# Patient Record
Sex: MALE | Race: WHITE | NOT HISPANIC OR LATINO
[De-identification: names, ages, dates, MRNs, and addresses within clinical notes are randomized per-mention and may not be internally consistent; named-entity substitution may affect disease eponyms.]

---

## 2017-01-24 ENCOUNTER — TRANSCRIPTION ENCOUNTER (OUTPATIENT)
Age: 42
End: 2017-01-24

## 2018-01-21 ENCOUNTER — TRANSCRIPTION ENCOUNTER (OUTPATIENT)
Age: 43
End: 2018-01-21

## 2018-12-03 ENCOUNTER — TRANSCRIPTION ENCOUNTER (OUTPATIENT)
Age: 43
End: 2018-12-03

## 2020-07-07 ENCOUNTER — TRANSCRIPTION ENCOUNTER (OUTPATIENT)
Age: 45
End: 2020-07-07

## 2021-01-06 ENCOUNTER — NON-APPOINTMENT (OUTPATIENT)
Age: 46
End: 2021-01-06

## 2021-01-07 ENCOUNTER — NON-APPOINTMENT (OUTPATIENT)
Age: 46
End: 2021-01-07

## 2021-01-15 PROBLEM — Z87.891 FORMER SMOKER: Status: ACTIVE | Noted: 2021-01-15

## 2021-01-15 PROBLEM — E73.9 LACTOSE INTOLERANCE IN ADULT: Status: ACTIVE | Noted: 2021-01-15

## 2021-01-15 PROBLEM — L70.9 ACNE, UNSPECIFIED ACNE TYPE: Status: ACTIVE | Noted: 2021-01-15

## 2021-01-15 PROBLEM — Z87.448 HISTORY OF GROSS HEMATURIA: Status: RESOLVED | Noted: 2021-01-15 | Resolved: 2021-01-15

## 2021-01-15 PROBLEM — Z84.1 FAMILY HISTORY OF CHRONIC KIDNEY DISEASE: Status: ACTIVE | Noted: 2021-01-15

## 2021-01-15 PROBLEM — Z87.448 HISTORY OF RENAL COLIC: Status: RESOLVED | Noted: 2021-01-15 | Resolved: 2021-01-15

## 2021-01-15 PROBLEM — I86.1 BILATERAL VARICOCELES: Status: RESOLVED | Noted: 2021-01-15 | Resolved: 2021-01-15

## 2021-01-15 PROBLEM — R51.9 CHRONIC HEADACHE: Status: ACTIVE | Noted: 2021-01-15

## 2021-01-15 PROBLEM — Z87.898 FORMER CONSUMPTION OF ALCOHOL: Status: ACTIVE | Noted: 2021-01-15

## 2021-01-21 ENCOUNTER — APPOINTMENT (OUTPATIENT)
Dept: UROLOGY | Facility: CLINIC | Age: 46
End: 2021-01-21
Payer: COMMERCIAL

## 2021-01-21 VITALS
SYSTOLIC BLOOD PRESSURE: 145 MMHG | OXYGEN SATURATION: 97 % | TEMPERATURE: 97.6 F | HEART RATE: 77 BPM | DIASTOLIC BLOOD PRESSURE: 92 MMHG

## 2021-01-21 DIAGNOSIS — L70.9 ACNE, UNSPECIFIED: ICD-10-CM

## 2021-01-21 DIAGNOSIS — Z87.448 PERSONAL HISTORY OF OTHER DISEASES OF URINARY SYSTEM: ICD-10-CM

## 2021-01-21 DIAGNOSIS — Z84.1 FAMILY HISTORY OF DISORDERS OF KIDNEY AND URETER: ICD-10-CM

## 2021-01-21 DIAGNOSIS — E73.9 LACTOSE INTOLERANCE, UNSPECIFIED: ICD-10-CM

## 2021-01-21 DIAGNOSIS — I86.1 SCROTAL VARICES: ICD-10-CM

## 2021-01-21 DIAGNOSIS — G89.29 HEADACHE, UNSPECIFIED: ICD-10-CM

## 2021-01-21 DIAGNOSIS — Z87.898 PERSONAL HISTORY OF OTHER SPECIFIED CONDITIONS: ICD-10-CM

## 2021-01-21 DIAGNOSIS — R51.9 HEADACHE, UNSPECIFIED: ICD-10-CM

## 2021-01-21 DIAGNOSIS — Z87.891 PERSONAL HISTORY OF NICOTINE DEPENDENCE: ICD-10-CM

## 2021-01-21 LAB
BILIRUB UR QL STRIP: NORMAL
CLARITY UR: CLEAR
COLLECTION METHOD: NORMAL
GLUCOSE UR-MCNC: NORMAL
HCG UR QL: 0.2 EU/DL
HGB UR QL STRIP.AUTO: NORMAL
KETONES UR-MCNC: NORMAL
LEUKOCYTE ESTERASE UR QL STRIP: NORMAL
NITRITE UR QL STRIP: NORMAL
PH UR STRIP: 6.5
PROT UR STRIP-MCNC: NORMAL
SP GR UR STRIP: 1.01

## 2021-01-21 PROCEDURE — 99204 OFFICE O/P NEW MOD 45 MIN: CPT | Mod: 25

## 2021-01-21 PROCEDURE — 81003 URINALYSIS AUTO W/O SCOPE: CPT | Mod: QW

## 2021-01-21 PROCEDURE — 76857 US EXAM PELVIC LIMITED: CPT

## 2021-01-21 PROCEDURE — 99072 ADDL SUPL MATRL&STAF TM PHE: CPT

## 2021-01-21 RX ORDER — MINOCYCLINE HYDROCHLORIDE 50 MG/1
50 CAPSULE ORAL
Refills: 0 | Status: DISCONTINUED | COMMUNITY
End: 2021-01-21

## 2021-01-21 RX ORDER — AMPICILLIN 500 MG/1
500 CAPSULE ORAL
Refills: 0 | Status: ACTIVE | COMMUNITY

## 2021-01-21 NOTE — LETTER BODY
[Dear  ___] : Dear  [unfilled], [Consult Letter:] : I had the pleasure of evaluating your patient, [unfilled]. [Please see my note below.] : Please see my note below. [Consult Closing:] : Thank you very much for allowing me to participate in the care of this patient.  If you have any questions, please do not hesitate to contact me. [Sincerely,] : Sincerely, [FreeTextEntry3] : Jayden Nunez MD, FACS

## 2021-01-21 NOTE — PHYSICAL EXAM
[General Appearance - Well Developed] : well developed [General Appearance - Well Nourished] : well nourished [Normal Appearance] : normal appearance [Well Groomed] : well groomed [General Appearance - In No Acute Distress] : no acute distress [Edema] : no peripheral edema [Respiration, Rhythm And Depth] : normal respiratory rhythm and effort [Exaggerated Use Of Accessory Muscles For Inspiration] : no accessory muscle use [Abdomen Soft] : soft [Abdomen Tenderness] : non-tender [Costovertebral Angle Tenderness] : no ~M costovertebral angle tenderness [Urethral Meatus] : meatus normal [Urinary Bladder Findings] : the bladder was normal on palpation [Scrotum] : the scrotum was normal [Testes Mass (___cm)] : there were no testicular masses [No Prostate Nodules] : no prostate nodules [Normal Station and Gait] : the gait and station were normal for the patient's age [] : no rash [No Focal Deficits] : no focal deficits [Oriented To Time, Place, And Person] : oriented to person, place, and time [Affect] : the affect was normal [Mood] : the mood was normal [Not Anxious] : not anxious [No Palpable Adenopathy] : no palpable adenopathy [FreeTextEntry1] : left varicocele

## 2021-01-21 NOTE — HISTORY OF PRESENT ILLNESS
[FreeTextEntry1] : Mr. JESSI CULP comes in today for a urologic evaluation, having last been seen in April 2017. Approximatley 3 weeks ago he experienced a recurrence of left flank pain, which resolved spontaneously after taking advil alone.  \par \par From his general urologic history, Mr. Culp presents with mild chronic lower urinary tract symptoms (predominantly frequency), with nocturia x 1. \par IPSS: 6/35\par Sono: 30cc PVR; 45cc prostate\par \par Additionally, Mr. Culp has a bulbous urethral stricture (calibrated to 18 Fr) diagnosed in 2017 on a cystoscopy. \par \par He has normal erections. \par \par He and his wife have previously appeared for a fertility consultation  Three years ago their daughter was born, conceived without any intervention.\par \par CT: 1/18/21--Multiple bilateral non-obstructing renal calculi, measuring up to 2mm: 3, 1mm  left and 5-6, 1mm right renal stones.  Some of these appear new; 2/15/17--Increased scattered 0.2 - 0.4 cm bilateral renal calcifications measuring up to 227HU; 5/20/10--Two tiny left upper pole renal stones, likely 6mm left renal cyst; 2/4/09--No stone seen; 7/30/04--Interval resolution of renal calculi and collecting system obstruction; 11/02--3 mm left distal ureteral stone. \par \par Renal US: 4/11/17--Bilateral 3 mm renal stones. No change from 2/17 CT; 5/23/13--No stones seen. 5/9/12-- 3mm left renal stone; 5/18/11--3mm left renal stone, 8 mm left renal cyst. 12/7/10--Left upper pole stones; 8/21/09--Unremarkable kidneys; \par \par KUB: 5/23/13--No stones seen; 5/9/12--No stone seen; 8/31/09--Essentially normal; 8/21/09--7 mm calcification over left sacroiliac joint; 2/4/09--Normal study; 8/2/04--No stone seen; \par \par MSE: 2/27/17--Borderline hyperoxaluria, marked hypocitraturia; 5/24/10--Low volume, elevated Ca++Oxalate and uric acid. \par \par Labs: 2/27/17--Minimally elevated serum uric acid. 1/20/17--Testosterone: 152.9; 10/15/16--Testosterone: 162.5; 5/8/10--SMAC: Ca++10.4, ALT--67; \par \par Stone Analysis: 3/8/11--95% Ca++ Oxalate, 5% Ca++ Phosphate; 4/8/09--Ca++ Oxalate; \par \par PSAs: 1/20/17--0.62;

## 2021-01-21 NOTE — ASSESSMENT
[FreeTextEntry1] : I discussed the findings and options with Mr. JESSI CULP in detail.\par Because of his longstanding history of urolithiasis, I advised that he consider a metabolic stone evaluation and I have referred him to Dr. Marco aSrkar for this as well as any more specific dietary recommendations.\par \par From a Urologic perspective, I would like to see Mr. Culp in one year.

## 2021-01-21 NOTE — ADDENDUM
[FreeTextEntry1] : A portion of this note was written by [Carlitos Albrecht] on 01/15/2021 acting as a scribe for Dr. Nunez. \par \par I have personally reviewed the chart and agree that the record accurately reflects my personal performance of the history, physical exam, assessment, and plan.

## 2021-08-16 ENCOUNTER — NON-APPOINTMENT (OUTPATIENT)
Age: 46
End: 2021-08-16

## 2021-08-16 ENCOUNTER — APPOINTMENT (OUTPATIENT)
Dept: NEPHROLOGY | Facility: CLINIC | Age: 46
End: 2021-08-16
Payer: COMMERCIAL

## 2021-08-16 VITALS — HEART RATE: 70 BPM | DIASTOLIC BLOOD PRESSURE: 72 MMHG | SYSTOLIC BLOOD PRESSURE: 106 MMHG

## 2021-08-16 VITALS — TEMPERATURE: 97.3 F | WEIGHT: 153 LBS

## 2021-08-16 PROCEDURE — 99204 OFFICE O/P NEW MOD 45 MIN: CPT | Mod: 25

## 2021-08-16 PROCEDURE — 36415 COLL VENOUS BLD VENIPUNCTURE: CPT

## 2021-08-16 NOTE — HISTORY OF PRESENT ILLNESS
[FreeTextEntry1] : Kindly referred by Dr. Nunez for kidney stones. PCP: Dr. Dash Figueroa \par \par * Passed first stone 20 years ago. He has passed at least 12 stones, usually flank pain. No interventions required.   1 month ago stone passed painlessly. Stone analysis in 2011 and 2009 calcium oxalate. \par \par CT: 1/18/21--Multiple bilateral non-obstructing renal calculi, measuring up to 2mm: 3, 1mm left and 5-6, 1mm right renal stones. Some of these appear new; 2/15/17--Increased scattered 0.2 - 0.4 cm bilateral renal calcifications measuring up to 227HU; 5/20/10--Two tiny left upper pole renal stones, likely 6mm left renal cyst; 2/4/09--No stone seen; 7/30/04--Interval resolution of renal calculi and collecting system obstruction; 11/02--3 mm left distal ureteral stone.\par \par MSE: 2/27/17--Borderline hyperoxaluria, marked hypocitraturia; 5/24/10--Low volume, elevated Ca++Oxalate and uric acid. (To be obtained.) \par \par * HTN controlled. BP 120s - 130s at home. No lightheadedness. Compliant with medications. \par

## 2021-08-16 NOTE — ASSESSMENT
[FreeTextEntry1] : # Recurrent nephrolithiasis with low urine volume, marked hypocitraturia, and borderline hyperoxaluria.\par * Watermind brie prescribed for 3 L/d fluid intake. \par * Start potassium citrate tablets. \par * A point of care renal ultrasound using the Butterfly iQ was performed and the images were personally reviewed. (The patient understands that this was a brief study to evaluate for hydronephrosis and not a complete renal ultrasound.) The ultrasound showed no significant hydronephrosis, normal echogenicity. \par * Low oxalate diet.\par * Recheck litholink in 3 months.\par * Recheck labs, including PTH and ionized calcium given borderline hypercalcemia. \par \par # HTN controlled.\par * The patient's blood pressure was checked with the Omron [id number]-[id number] using the SPRINT trial protocol after sitting quietly in an empty room with arm supported, back supported, and feet on the floor for 5 minutes. The average of 3 readings were taken.\par * A counseling information sheet has been given (currently or previously, in-person or electronically). All their questions were answered.\par * The patient has been counseled to check their BP at home with an automatic arm cuff, write down the readings, and reach me directly on the phone immediately if they are persistently > 180 systolic or if SBP is less than 100 or if lightheadedness develops. They were counseled to bring in all blood pressure readings and medications next visit.\par * The patient has been counseled that regular office followup (at least every 4 months for now)  is important for monitoring and for their health, and that it is their responsibility to make follow up appointments.\par * The patient also has been counseled that they must never stop or change any medications without discussing this with me (or another physician). \par

## 2021-08-17 ENCOUNTER — TRANSCRIPTION ENCOUNTER (OUTPATIENT)
Age: 46
End: 2021-08-17

## 2021-08-18 LAB
25(OH)D3 SERPL-MCNC: 46.9 NG/ML
ALBUMIN SERPL ELPH-MCNC: 5 G/DL
ALP BLD-CCNC: 61 U/L
ALT SERPL-CCNC: 22 U/L
ANION GAP SERPL CALC-SCNC: 14 MMOL/L
APPEARANCE: CLEAR
AST SERPL-CCNC: 22 U/L
BASOPHILS # BLD AUTO: 0.03 K/UL
BASOPHILS NFR BLD AUTO: 0.3 %
BILIRUB SERPL-MCNC: 0.5 MG/DL
BILIRUBIN URINE: NEGATIVE
BLOOD URINE: NEGATIVE
BUN SERPL-MCNC: 16 MG/DL
CA-I SERPL-SCNC: 1.27 MMOL/L
CALCIUM SERPL-MCNC: 9.9 MG/DL
CALCIUM SERPL-MCNC: 9.9 MG/DL
CHLORIDE SERPL-SCNC: 98 MMOL/L
CO2 SERPL-SCNC: 25 MMOL/L
COLOR: YELLOW
CREAT SERPL-MCNC: 0.74 MG/DL
CREAT SPEC-SCNC: 108 MG/DL
CYSTATIN C SERPL-MCNC: 0.69 MG/L
EOSINOPHIL # BLD AUTO: 0.12 K/UL
EOSINOPHIL NFR BLD AUTO: 1.4 %
GFR/BSA.PRED SERPLBLD CYS-BASED-ARV: 118 ML/MIN
GLUCOSE QUALITATIVE U: NEGATIVE
GLUCOSE SERPL-MCNC: 89 MG/DL
HCT VFR BLD CALC: 46.8 %
HGB BLD-MCNC: 14.8 G/DL
IMM GRANULOCYTES NFR BLD AUTO: 0.3 %
KETONES URINE: NEGATIVE
LEUKOCYTE ESTERASE URINE: NEGATIVE
LYMPHOCYTES # BLD AUTO: 1.48 K/UL
LYMPHOCYTES NFR BLD AUTO: 17.1 %
MAN DIFF?: NORMAL
MCHC RBC-ENTMCNC: 30.6 PG
MCHC RBC-ENTMCNC: 31.6 GM/DL
MCV RBC AUTO: 96.7 FL
MICROALBUMIN 24H UR DL<=1MG/L-MCNC: <1.2 MG/DL
MICROALBUMIN/CREAT 24H UR-RTO: NORMAL MG/G
MONOCYTES # BLD AUTO: 0.58 K/UL
MONOCYTES NFR BLD AUTO: 6.7 %
NEUTROPHILS # BLD AUTO: 6.39 K/UL
NEUTROPHILS NFR BLD AUTO: 74.2 %
NITRITE URINE: NEGATIVE
PARATHYROID HORMONE INTACT: 34 PG/ML
PH URINE: 6
PLATELET # BLD AUTO: 192 K/UL
POTASSIUM SERPL-SCNC: 4 MMOL/L
PROT SERPL-MCNC: 7.2 G/DL
PROTEIN URINE: NEGATIVE
RBC # BLD: 4.84 M/UL
RBC # FLD: 13.1 %
SODIUM SERPL-SCNC: 137 MMOL/L
SPECIFIC GRAVITY URINE: 1.02
UROBILINOGEN URINE: NORMAL
VIT B12 SERPL-MCNC: 1821 PG/ML
WBC # FLD AUTO: 8.63 K/UL

## 2021-12-29 ENCOUNTER — APPOINTMENT (OUTPATIENT)
Dept: NEPHROLOGY | Facility: CLINIC | Age: 46
End: 2021-12-29
Payer: COMMERCIAL

## 2021-12-29 PROCEDURE — 99214 OFFICE O/P EST MOD 30 MIN: CPT | Mod: 95

## 2021-12-29 NOTE — ASSESSMENT
[FreeTextEntry1] : # Recurrent nephrolithiasis with low urine volume, marked hypocitraturia, and borderline hyperoxaluria.\par * Low oxalate diet. (Reduce black tea and spinach.) Reduce protein intake. \par * Continue potassium citrate tablets. \par * Recheck renal ultrasound. \par * Follow up in 4 months. \par \par # HTN controlled.\par * Cont diovan/HCTZ. \par * A counseling information sheet has been given (currently or previously, in-person or electronically). All their questions were answered.\par * The patient has been counseled to check their BP at home with an automatic arm cuff, write down the readings, and reach me directly on the phone immediately if they are persistently > 180 systolic or if SBP is less than 100 or if lightheadedness develops. They were counseled to bring in all blood pressure readings and medications next visit.\par * The patient has been counseled that regular office followup (at least every 4 months for now) is important for monitoring and for their health, and that it is their responsibility to make follow up appointments.\par * The patient also has been counseled that they must never stop or change any medications without discussing this with me (or another physician). \par

## 2021-12-29 NOTE — HISTORY OF PRESENT ILLNESS
[FreeTextEntry1] : Kindly referred by Dr. Nunez for kidney stones. Covid vaccine x 3 [id number]. \par \par * Urine volume increased to 2.33. SS CaCo. * Oxalate 40. Citrate 492. PCR 1.4.  * No recent kidney stones. * HTN controlled. No lightheadedness. Compliant with medications. \par \par Previous history ([id number]): * Passed first stone 20 years ago. He has passed at least 12 stones, usually flank pain. No interventions required.   1 month ago stone passed painlessly. Stone analysis in 2011 and 2009 calcium oxalate. \par \par CT: 1/18/21--Multiple bilateral non-obstructing renal calculi, measuring up to 2mm: 3, 1mm left and 5-6, 1mm right renal stones. Some of these appear new; 2/15/17--Increased scattered 0.2 - 0.4 cm bilateral renal calcifications measuring up to 227HU; 5/20/10--Two tiny left upper pole renal stones, likely 6mm left renal cyst; 2/4/09--No stone seen; 7/30/04--Interval resolution of renal calculi and collecting system obstruction; 11/02--3 mm left distal ureteral stone.\par \par MSE: 2/27/17--Borderline hyperoxaluria, marked hypocitraturia; 5/24/10--Low volume, elevated Ca++Oxalate and uric acid. (To be obtained.) \par \par

## 2022-08-05 ENCOUNTER — TRANSCRIPTION ENCOUNTER (OUTPATIENT)
Age: 47
End: 2022-08-05

## 2022-11-04 ENCOUNTER — RESULT REVIEW (OUTPATIENT)
Age: 47
End: 2022-11-04

## 2022-11-17 ENCOUNTER — OUTPATIENT (OUTPATIENT)
Dept: OUTPATIENT SERVICES | Facility: HOSPITAL | Age: 47
LOS: 1 days | End: 2022-11-17
Payer: COMMERCIAL

## 2022-11-17 ENCOUNTER — APPOINTMENT (OUTPATIENT)
Dept: ULTRASOUND IMAGING | Facility: HOSPITAL | Age: 47
End: 2022-11-17

## 2022-11-17 PROCEDURE — 76770 US EXAM ABDO BACK WALL COMP: CPT

## 2022-11-17 PROCEDURE — 76770 US EXAM ABDO BACK WALL COMP: CPT | Mod: 26

## 2022-11-29 ENCOUNTER — APPOINTMENT (OUTPATIENT)
Dept: NEPHROLOGY | Facility: CLINIC | Age: 47
End: 2022-11-29

## 2022-11-29 VITALS — SYSTOLIC BLOOD PRESSURE: 124 MMHG | DIASTOLIC BLOOD PRESSURE: 80 MMHG | HEART RATE: 57 BPM

## 2022-11-29 PROCEDURE — 36415 COLL VENOUS BLD VENIPUNCTURE: CPT

## 2022-11-29 PROCEDURE — 99214 OFFICE O/P EST MOD 30 MIN: CPT | Mod: 25

## 2022-11-29 NOTE — ASSESSMENT
[FreeTextEntry1] : # Recurrent nephrolithiasis with low urine volume, marked hypocitraturia, and borderline hyperoxaluria.\par * Low oxalate diet. Reduce protein intake. \par * Continue potassium citrate tablets. \par * Recheck litholink. \par * Follow up in 4 months. \par * Recheck labs. \par \par # HTN controlled.\par * Cont diovan/HCTZ. \par * A counseling information sheet has been given (currently or previously, in-person or electronically). All their questions were answered.\par * The patient has been counseled to check their BP at home with an automatic arm cuff, write down the readings, and reach me directly on the phone immediately if they are persistently > 180 systolic or if SBP is less than 100 or if lightheadedness develops. They were counseled to bring in all blood pressure readings and medications next visit.\par * The patient has been counseled that regular office followup (at least every 4 months for now) is important for monitoring and for their health, and that it is their responsibility to make follow up appointments.\par * The patient also has been counseled that they must never stop or change any medications without discussing this with me (or another physician).

## 2022-11-29 NOTE — HISTORY OF PRESENT ILLNESS
[FreeTextEntry1] : Kindly referred by Dr. Nunez for kidney stones. Covid vaccine x 3 [id number].  \par \par * HTN controlled. BP 120s at home. No lightheadedness. No CP/SOB. Compliant with medications. * No renal colic. Ultrasound 4 mm midpole left. * Potasium citrate started last visit. Following low oxalate diet. * Labs checked by Dr. Figueroa while on potassium. This will be forwarded. \par \par Previous history (06Uml31): * Urine volume increased to 2.33. SS CaCo. * Oxalate 40. Citrate 492. PCR 1.4.  * No recent kidney stones. * HTN controlled. No lightheadedness. Compliant with medications. \par \par Previous history (09Nbr40): * Passed first stone 20 years ago. He has passed at least 12 stones, usually flank pain. No interventions required.   1 month ago stone passed painlessly. Stone analysis in 2011 and 2009 calcium oxalate. \par \par CT: 1/18/21--Multiple bilateral non-obstructing renal calculi, measuring up to 2mm: 3, 1mm left and 5-6, 1mm right renal stones. Some of these appear new; 2/15/17--Increased scattered 0.2 - 0.4 cm bilateral renal calcifications measuring up to 227HU; 5/20/10--Two tiny left upper pole renal stones, likely 6mm left renal cyst; 2/4/09--No stone seen; 7/30/04--Interval resolution of renal calculi and collecting system obstruction; 11/02--3 mm left distal ureteral stone.\par \par MSE: 2/27/17--Borderline hyperoxaluria, marked hypocitraturia; 5/24/10--Low volume, elevated Ca++Oxalate and uric acid. (To be obtained.) \par \par

## 2022-12-01 LAB
ALBUMIN SERPL ELPH-MCNC: 4.6 G/DL
ALP BLD-CCNC: 67 U/L
ALT SERPL-CCNC: 32 U/L
ANION GAP SERPL CALC-SCNC: 14 MMOL/L
APPEARANCE: CLEAR
AST SERPL-CCNC: 24 U/L
BASOPHILS # BLD AUTO: 0.04 K/UL
BASOPHILS NFR BLD AUTO: 0.5 %
BILIRUB SERPL-MCNC: 0.5 MG/DL
BILIRUBIN URINE: NEGATIVE
BLOOD URINE: NEGATIVE
BUN SERPL-MCNC: 19 MG/DL
CALCIUM SERPL-MCNC: 9.4 MG/DL
CHLORIDE SERPL-SCNC: 99 MMOL/L
CO2 SERPL-SCNC: 24 MMOL/L
COLOR: NORMAL
CREAT SERPL-MCNC: 0.69 MG/DL
CREAT SPEC-SCNC: 37 MG/DL
CYSTATIN C SERPL-MCNC: 0.74 MG/L
EGFR: 113 ML/MIN/1.73M2
EOSINOPHIL # BLD AUTO: 0.28 K/UL
EOSINOPHIL NFR BLD AUTO: 3.2 %
GFR/BSA.PRED SERPLBLD CYS-BASED-ARV: 114 ML/MIN/1.73M2
GLUCOSE QUALITATIVE U: NEGATIVE
GLUCOSE SERPL-MCNC: 88 MG/DL
HCT VFR BLD CALC: 45 %
HGB BLD-MCNC: 15 G/DL
IMM GRANULOCYTES NFR BLD AUTO: 0.3 %
KETONES URINE: NEGATIVE
LEUKOCYTE ESTERASE URINE: NEGATIVE
LYMPHOCYTES # BLD AUTO: 2.12 K/UL
LYMPHOCYTES NFR BLD AUTO: 23.9 %
MAGNESIUM SERPL-MCNC: 2.2 MG/DL
MAN DIFF?: NORMAL
MCHC RBC-ENTMCNC: 31.3 PG
MCHC RBC-ENTMCNC: 33.3 GM/DL
MCV RBC AUTO: 93.9 FL
MICROALBUMIN 24H UR DL<=1MG/L-MCNC: <1.2 MG/DL
MICROALBUMIN/CREAT 24H UR-RTO: NORMAL MG/G
MONOCYTES # BLD AUTO: 0.89 K/UL
MONOCYTES NFR BLD AUTO: 10 %
NEUTROPHILS # BLD AUTO: 5.52 K/UL
NEUTROPHILS NFR BLD AUTO: 62.1 %
NITRITE URINE: NEGATIVE
PH URINE: 6.5
PLATELET # BLD AUTO: 182 K/UL
POTASSIUM SERPL-SCNC: 3.7 MMOL/L
PROT SERPL-MCNC: 6.9 G/DL
PROTEIN URINE: NEGATIVE
RBC # BLD: 4.79 M/UL
RBC # FLD: 12.6 %
SODIUM SERPL-SCNC: 138 MMOL/L
SPECIFIC GRAVITY URINE: 1.01
UROBILINOGEN URINE: NORMAL
WBC # FLD AUTO: 8.88 K/UL

## 2023-02-14 ENCOUNTER — APPOINTMENT (OUTPATIENT)
Dept: NEPHROLOGY | Facility: CLINIC | Age: 48
End: 2023-02-14
Payer: COMMERCIAL

## 2023-02-14 VITALS — DIASTOLIC BLOOD PRESSURE: 82 MMHG | HEART RATE: 81 BPM | SYSTOLIC BLOOD PRESSURE: 129 MMHG

## 2023-02-14 VITALS — SYSTOLIC BLOOD PRESSURE: 140 MMHG | DIASTOLIC BLOOD PRESSURE: 95 MMHG

## 2023-02-14 PROCEDURE — 99214 OFFICE O/P EST MOD 30 MIN: CPT

## 2023-02-14 NOTE — HISTORY OF PRESENT ILLNESS
[FreeTextEntry1] : Kindly referred by Dr. Nunez for kidney stones. Covid vaccine x 3 [id number].  \par \par Email 02Feb: "I actually did have a weird experience - for several days last week, and ending on Monday, I was constantly light-headed.  It wasn’t debilitating and I could still function normally, but I haven’t experienced that before.  I checked my BP and it was 118/76. That doesn’t seem low enough to be a problem (actually, I thought it was quite good), but I have no explanation for what happened.  I feel fine now.  I have my annual physical with Dr. Rodriguez at the end of February and I have scheduled an eye exam of next week. Not sure if this is something to worry about. " He says this was "spinning" and not presyncope.  * 24 hour urine in 12/22: 2.3 volume, 43 oxalate, citrate 629, calcium 102. * HTN controlled.  - 130s at home. No lightheadedness. No CP/SOB. Compliant with medications. * Labs to be checked by Dr. Rodriguez. \par \par Previous history (29Nov22): * HTN controlled. BP 120s at home. No lightheadedness. No CP/SOB. Compliant with medications. * No renal colic. Ultrasound 4 mm midpole left. * Potasium citrate started last visit. Following low oxalate diet. * Labs checked by Dr. Figueroa while on potassium. This will be forwarded. \par \par Previous history (14Fwv86): * Urine volume increased to 2.33. SS CaCo. * Oxalate 40. Citrate 492. PCR 1.4.  * No recent kidney stones. * HTN controlled. No lightheadedness. Compliant with medications. \par \par Previous history (09Fav15): * Passed first stone 20 years ago. He has passed at least 12 stones, usually flank pain. No interventions required.   1 month ago stone passed painlessly. Stone analysis in 2011 and 2009 calcium oxalate. \par \par CT: 1/18/21--Multiple bilateral non-obstructing renal calculi, measuring up to 2mm: 3, 1mm left and 5-6, 1mm right renal stones. Some of these appear new; 2/15/17--Increased scattered 0.2 - 0.4 cm bilateral renal calcifications measuring up to 227HU; 5/20/10--Two tiny left upper pole renal stones, likely 6mm left renal cyst; 2/4/09--No stone seen; 7/30/04--Interval resolution of renal calculi and collecting system obstruction; 11/02--3 mm left distal ureteral stone.\par \par MSE: 2/27/17--Borderline hyperoxaluria, marked hypocitraturia; 5/24/10--Low volume, elevated Ca++Oxalate and uric acid. (To be obtained.) \par \par

## 2023-02-14 NOTE — ASSESSMENT
[FreeTextEntry1] : # HTN controlled.\par * Continue diovan/HCTZ.\par * The patient's blood pressure was checked with the [employer]ron HEM-907XL using the SPRINT trial protocol after sitting quietly in an empty room with arm supported, back supported, and feet on the floor for 5 minutes. The average of 3 readings were taken.\par * A counseling information sheet on blood pressure and staying healthy has been given (which they have been instructed to read).\par * The patient has been counseled to check their BP at home with an automatic arm cuff, write down the readings, and reach me directly on the phone immediately if they are persistently > 180 systolic or if SBP is less than 100 or if lightheadedness develops. They were counseled to bring in all blood pressure readings and medications next visit.\par * The patient has been counseled that regular office follow-up (at least every 4 months for now)  is important for monitoring and for their health, and that it is their responsibility to make follow up appointments.\par * The patient also has been counseled that they must never stop or change any medications without discussing this with me (or another physician). \par \par # Dizziness due to vertigo.\par * Advised to call Dr. Rodriguez immediately if recurs and check BP. \par \par # Kidney stones with hyperoxaluria and hypocitraturia.\par * Continue potassium citrate.\par * Low oxalate diet.\par * Check 24 hour urine next visit. \par * Check ultrasound november 2023.

## 2023-06-20 ENCOUNTER — APPOINTMENT (OUTPATIENT)
Dept: NEPHROLOGY | Facility: CLINIC | Age: 48
End: 2023-06-20
Payer: COMMERCIAL

## 2023-06-20 VITALS — SYSTOLIC BLOOD PRESSURE: 122 MMHG | DIASTOLIC BLOOD PRESSURE: 64 MMHG

## 2023-06-20 VITALS — HEIGHT: 69 IN | WEIGHT: 192 LBS | BODY MASS INDEX: 28.44 KG/M2

## 2023-06-20 VITALS — WEIGHT: 192 LBS

## 2023-06-20 DIAGNOSIS — E78.5 HYPERLIPIDEMIA, UNSPECIFIED: ICD-10-CM

## 2023-06-20 PROCEDURE — 99214 OFFICE O/P EST MOD 30 MIN: CPT

## 2023-06-20 NOTE — ASSESSMENT
[FreeTextEntry1] : # HTN controlled.\par * Continue diovan/HCTZ.\par * The patient's blood pressure was checked with the Omron HEM-907XL using the SPRINT trial protocol after sitting quietly in an empty room with arm supported, back supported, and feet on the floor for 5 minutes. The average of 3 readings were taken.\par * A counseling information sheet on blood pressure and staying healthy has been given (which they have been instructed to read).\par * The patient has been counseled to check their BP at home with an automatic arm cuff, write down the readings, and reach me directly on the phone immediately if they are persistently > 180 systolic or if SBP is less than 100 or if lightheadedness develops. They were counseled to bring in all blood pressure readings and medications next visit.\par * The patient has been counseled that regular office follow-up (at least every 6 months for now) is important for monitoring and for their health, and that it is their responsibility to make follow up appointments.\par * The patient also has been counseled that they must never stop or change any medications without discussing this with me (or another physician). \par \par # Dizziness due to vertigo.\par * Advised to call Dr. Rodriguez immediately if recurs and check BP. \par \par # Kidney stones with hyperoxaluria and hypocitraturia.\par * Continue potassium citrate.\par * Low oxalate diet.\par * Check 24 hour urine. \par * Check ultrasound november 2023. \par \par # Overweight.\par * Weight loss counseling given.

## 2023-06-20 NOTE — HISTORY OF PRESENT ILLNESS
[FreeTextEntry1] : Kindly referred by Dr. Nunez for kidney stones. Covid vaccine x 3 [id number].  PCP: Dr. Dash Rodriguez \par \par  * 24 hour urine in 12/22: 2.3 volume, 43 oxalate, citrate 629, calcium 102. No renal colic. Nov22 US: 4 mm stone left kidney. Following up with Dr. Nunez. Following low oxalate diet. * No further vertigo. * BMI 28. * BMi stable at 28. * Labs performed by Dr. Rodriguez. HGA1c 5.8. * BP controlled. BP 120s at home. No lightheadedness. No CP/SOB. Compliant with medications. * \par \par Previous history (78Hmo55): Email 02Feb: "I actually did have a weird experience - for several days last week, and ending on Monday, I was constantly light-headed.  It wasn’t debilitating and I could still function normally, but I haven’t experienced that before.  I checked my BP and it was 118/76. That doesn’t seem low enough to be a problem (actually, I thought it was quite good), but I have no explanation for what happened.  I feel fine now.  I have my annual physical with Dr. Rodriguez at the end of February and I have scheduled an eye exam of next week. Not sure if this is something to worry about. " He says this was "spinning" and not presyncope.  * 24 hour urine in 12/22: 2.3 volume, 43 oxalate, citrate 629, calcium 102. * HTN controlled.  - 130s at home. No lightheadedness. No CP/SOB. Compliant with medications. * Labs to be checked by Dr. Rodriguez. \par \par Previous history (29Nov22): * HTN controlled. BP 120s at home. No lightheadedness. No CP/SOB. Compliant with medications. * No renal colic. Ultrasound 4 mm midpole left. * Potasium citrate started last visit. Following low oxalate diet. * Labs checked by Dr. Figueroa while on potassium. This will be forwarded. \par \par Previous history (32Bws72): * Urine volume increased to 2.33. SS CaCo. * Oxalate 40. Citrate 492. PCR 1.4.  * No recent kidney stones. * HTN controlled. No lightheadedness. Compliant with medications. \par \par Previous history (74Khj76): * Passed first stone 20 years ago. He has passed at least 12 stones, usually flank pain. No interventions required.   1 month ago stone passed painlessly. Stone analysis in 2011 and 2009 calcium oxalate. \par \par CT: 1/18/21--Multiple bilateral non-obstructing renal calculi, measuring up to 2mm: 3, 1mm left and 5-6, 1mm right renal stones. Some of these appear new; 2/15/17--Increased scattered 0.2 - 0.4 cm bilateral renal calcifications measuring up to 227HU; 5/20/10--Two tiny left upper pole renal stones, likely 6mm left renal cyst; 2/4/09--No stone seen; 7/30/04--Interval resolution of renal calculi and collecting system obstruction; 11/02--3 mm left distal ureteral stone.\par \par MSE: 2/27/17--Borderline hyperoxaluria, marked hypocitraturia; 5/24/10--Low volume, elevated Ca++Oxalate and uric acid. (To be obtained.) \par \par

## 2023-08-19 RX ORDER — POTASSIUM CITRATE 15 MEQ/1
15 MEQ TABLET, EXTENDED RELEASE ORAL
Qty: 270 | Refills: 3 | Status: ACTIVE | COMMUNITY
Start: 2021-08-16 | End: 1900-01-01

## 2023-11-13 PROBLEM — I86.1 LEFT VARICOCELE: Status: ACTIVE | Noted: 2021-01-21

## 2023-11-13 PROBLEM — Z84.1 FAMILY HISTORY OF KIDNEY STONES: Status: ACTIVE | Noted: 2021-01-15

## 2023-11-13 PROBLEM — R39.9 LOWER URINARY TRACT SYMPTOMS (LUTS): Status: ACTIVE | Noted: 2021-01-15

## 2023-11-13 PROBLEM — Z00.00 ENCOUNTER FOR PREVENTIVE HEALTH EXAMINATION: Status: ACTIVE | Noted: 2021-01-05

## 2023-11-13 PROBLEM — N35.912 BULBOUS URETHRAL STRICTURE: Status: ACTIVE | Noted: 2021-01-15

## 2023-11-14 ENCOUNTER — APPOINTMENT (OUTPATIENT)
Dept: UROLOGY | Facility: CLINIC | Age: 48
End: 2023-11-14
Payer: COMMERCIAL

## 2023-11-14 VITALS — HEART RATE: 94 BPM | DIASTOLIC BLOOD PRESSURE: 93 MMHG | OXYGEN SATURATION: 96 % | SYSTOLIC BLOOD PRESSURE: 149 MMHG

## 2023-11-14 DIAGNOSIS — R39.9 UNSPECIFIED SYMPTOMS AND SIGNS INVOLVING THE GENITOURINARY SYSTEM: ICD-10-CM

## 2023-11-14 DIAGNOSIS — Z84.1 FAMILY HISTORY OF DISORDERS OF KIDNEY AND URETER: ICD-10-CM

## 2023-11-14 DIAGNOSIS — Z30.09 ENCOUNTER FOR OTHER GENERAL COUNSELING AND ADVICE ON CONTRACEPTION: ICD-10-CM

## 2023-11-14 DIAGNOSIS — I86.1 SCROTAL VARICES: ICD-10-CM

## 2023-11-14 DIAGNOSIS — N35.912 UNSPECIFIED BULBOUS URETHRAL STRICTURE, MALE: ICD-10-CM

## 2023-11-14 DIAGNOSIS — Z00.00 ENCOUNTER FOR GENERAL ADULT MEDICAL EXAMINATION W/OUT ABNORMAL FINDINGS: ICD-10-CM

## 2023-11-14 LAB
BILIRUB UR QL STRIP: NORMAL
CLARITY UR: CLEAR
COLLECTION METHOD: NORMAL
GLUCOSE UR-MCNC: NORMAL
HCG UR QL: 1 EU/DL
HGB UR QL STRIP.AUTO: NORMAL
KETONES UR-MCNC: NORMAL
LEUKOCYTE ESTERASE UR QL STRIP: NORMAL
NITRITE UR QL STRIP: NORMAL
PH UR STRIP: 7.5
PROT UR STRIP-MCNC: NORMAL
SP GR UR STRIP: 1.02

## 2023-11-14 PROCEDURE — 81003 URINALYSIS AUTO W/O SCOPE: CPT | Mod: QW

## 2023-11-14 PROCEDURE — 99215 OFFICE O/P EST HI 40 MIN: CPT

## 2023-11-14 PROCEDURE — 51798 US URINE CAPACITY MEASURE: CPT

## 2023-11-15 ENCOUNTER — NON-APPOINTMENT (OUTPATIENT)
Age: 48
End: 2023-11-15

## 2023-11-15 LAB
APPEARANCE: CLEAR
BACTERIA: NEGATIVE /HPF
BILIRUBIN URINE: NEGATIVE
BLOOD URINE: NEGATIVE
CAST: 0 /LPF
COLOR: NORMAL
EPITHELIAL CELLS: 0 /HPF
GLUCOSE QUALITATIVE U: NEGATIVE MG/DL
KETONES URINE: NEGATIVE MG/DL
LEUKOCYTE ESTERASE URINE: NEGATIVE
MICROSCOPIC-UA: NORMAL
NITRITE URINE: NEGATIVE
PH URINE: 7.5
PROTEIN URINE: NORMAL MG/DL
PSA SERPL-MCNC: 0.65 NG/ML
RED BLOOD CELLS URINE: 15 /HPF
REVIEW: NORMAL
SPECIFIC GRAVITY URINE: 1.02
UROBILINOGEN URINE: 1 MG/DL
WHITE BLOOD CELLS URINE: 0 /HPF

## 2023-11-16 LAB
BACTERIA UR CULT: NORMAL
URINE CYTOLOGY: NORMAL

## 2023-12-14 ENCOUNTER — RESULT REVIEW (OUTPATIENT)
Age: 48
End: 2023-12-14

## 2023-12-14 ENCOUNTER — APPOINTMENT (OUTPATIENT)
Dept: NEPHROLOGY | Facility: CLINIC | Age: 48
End: 2023-12-14
Payer: COMMERCIAL

## 2023-12-14 VITALS — DIASTOLIC BLOOD PRESSURE: 77 MMHG | SYSTOLIC BLOOD PRESSURE: 118 MMHG | HEART RATE: 75 BPM

## 2023-12-14 VITALS — WEIGHT: 185 LBS | BODY MASS INDEX: 27.32 KG/M2

## 2023-12-14 DIAGNOSIS — R05.9 COUGH, UNSPECIFIED: ICD-10-CM

## 2023-12-14 PROCEDURE — 36415 COLL VENOUS BLD VENIPUNCTURE: CPT

## 2023-12-14 PROCEDURE — 99214 OFFICE O/P EST MOD 30 MIN: CPT | Mod: 25

## 2023-12-14 NOTE — ASSESSMENT
[FreeTextEntry1] : # Slight cough for 6 weeks. * Advised to see PCP and pulmonologist. They were instructed that this referral is important for their health and that it is their responsibility to make and keep this appointment. All their questions were answered.   # HTN controlled.  * Continue diovan/HCTZ.  * Recheck labs.   * The patient's blood pressure was checked with the Omron [id number]-907XL using the SPRINT trial protocol after sitting quietly in an empty room with arm supported, back supported, and feet on the floor for 5 minutes. The average of 3 readings were taken.  * A counseling information sheet on blood pressure and staying healthy has been given (which they have been instructed to read).  * The patient has been counseled to check their BP at home with an automatic arm cuff, write down the readings, and reach me directly on the phone immediately if they are persistently > 180 systolic or if SBP is less than 100 or if lightheadedness develops. They were counseled to bring in all blood pressure readings and medications next visit.  * The patient has been counseled that regular office follow-up (at least every 6 months for now) is important for monitoring and for their health, and that it is their responsibility to make follow up appointments.  * The patient also has been counseled that they must never stop or change any medications without discussing this with me (or another physician).   # Kidney stones with hyperoxaluria and hypocitraturia.  * Continue potassium citrate.  * Low oxalate diet.  * Check 24 hour urine next year.   * Recheck ultrasound.     # Overweight.  * Weight loss counseling given.

## 2023-12-14 NOTE — HISTORY OF PRESENT ILLNESS
[FreeTextEntry1] : Kindly referred by Dr. Nunez for kidney stones. Covid vaccine x 3 [id number].  PCP: Dr. Dash Rodriguez   * Occasional nonproductive cough for 6 weeks. Dx with bronchitis by urgent care. No cough currently. Covid 2 weeks prior to cough.  * 24 hour urine in 12/22: 2.3 volume, 43 oxalate, citrate 629, calcium 102. 7/22/23 litholink: better volume, low citrate, oxalate 41. Working on low oxalate diet. Citrate increased to 30/15.  No renal colic. Nov22 US: 4 mm stone left kidney. Following up with Dr. Nunez. Following low oxalate diet. * BP controlled. BP 120s - 130s at home. No lightheadedness. No CP/SOB. Compliant with medications. *   Previous history (20Jun23):  * 24 hour urine in 12/22: 2.3 volume, 43 oxalate, citrate 629, calcium 102. No renal colic. Nov22 US: 4 mm stone left kidney. Following up with Dr. Nunez. Following low oxalate diet. * No further vertigo. * BMI 28. * BMi stable at 28. * Labs performed by Dr. Rodriguez. HGA1c 5.8. * BP controlled. BP 120s at home. No lightheadedness. No CP/SOB. Compliant with medications. *   Previous history (14Feb23): Email 02Feb: "I actually did have a weird experience - for several days last week, and ending on Monday, I was constantly light-headed.  It wasn't debilitating and I could still function normally, but I haven't experienced that before.  I checked my BP and it was 118/76. That doesn't seem low enough to be a problem (actually, I thought it was quite good), but I have no explanation for what happened.  I feel fine now.  I have my annual physical with Dr. Rodriguez at the end of February and I have scheduled an eye exam of next week. Not sure if this is something to worry about. " He says this was "spinning" and not presyncope.  * 24 hour urine in 12/22: 2.3 volume, 43 oxalate, citrate 629, calcium 102. * HTN controlled.  - 130s at home. No lightheadedness. No CP/SOB. Compliant with medications. * Labs to be checked by Dr. Rodriguez.   Previous history (29Nov22): * HTN controlled. BP 120s at home. No lightheadedness. No CP/SOB. Compliant with medications. * No renal colic. Ultrasound 4 mm midpole left. * Potasium citrate started last visit. Following low oxalate diet. * Labs checked by Dr. Figueroa while on potassium. This will be forwarded.   Previous history (82Rsq29): * Urine volume increased to 2.33. SS CaCo. * Oxalate 40. Citrate 492. PCR 1.4.  * No recent kidney stones. * HTN controlled. No lightheadedness. Compliant with medications.   Previous history ([id number]): * Passed first stone 20 years ago. He has passed at least 12 stones, usually flank pain. No interventions required.   1 month ago stone passed painlessly. Stone analysis in 2011 and 2009 calcium oxalate.   CT: 1/18/21--Multiple bilateral non-obstructing renal calculi, measuring up to 2mm: 3, 1mm left and 5-6, 1mm right renal stones. Some of these appear new; 2/15/17--Increased scattered 0.2 - 0.4 cm bilateral renal calcifications measuring up to 227HU; 5/20/10--Two tiny left upper pole renal stones, likely 6mm left renal cyst; 2/4/09--No stone seen; 7/30/04--Interval resolution of renal calculi and collecting system obstruction; 11/02--3 mm left distal ureteral stone.  MSE: 2/27/17--Borderline hyperoxaluria, marked hypocitraturia; 5/24/10--Low volume, elevated Ca++Oxalate and uric acid. (To be obtained.)

## 2023-12-16 LAB
ALBUMIN SERPL ELPH-MCNC: 4.7 G/DL
ALP BLD-CCNC: 72 U/L
ALT SERPL-CCNC: 29 U/L
ANION GAP SERPL CALC-SCNC: 14 MMOL/L
APO B SERPL-MCNC: 102 MG/DL
APPEARANCE: CLEAR
AST SERPL-CCNC: 24 U/L
BASOPHILS # BLD AUTO: 0.04 K/UL
BASOPHILS NFR BLD AUTO: 0.5 %
BILIRUB SERPL-MCNC: 0.6 MG/DL
BILIRUBIN URINE: NEGATIVE
BLOOD URINE: NEGATIVE
BUN SERPL-MCNC: 18 MG/DL
CALCIUM SERPL-MCNC: 9.6 MG/DL
CHLORIDE SERPL-SCNC: 100 MMOL/L
CHOLEST SERPL-MCNC: 179 MG/DL
CO2 SERPL-SCNC: 23 MMOL/L
COLOR: YELLOW
CREAT SERPL-MCNC: 0.67 MG/DL
CREAT SPEC-SCNC: 74 MG/DL
EGFR: 114 ML/MIN/1.73M2
EOSINOPHIL # BLD AUTO: 0.16 K/UL
EOSINOPHIL NFR BLD AUTO: 1.9 %
ESTIMATED AVERAGE GLUCOSE: 111 MG/DL
FERRITIN SERPL-MCNC: 87 NG/ML
GLUCOSE QUALITATIVE U: NEGATIVE MG/DL
GLUCOSE SERPL-MCNC: 86 MG/DL
HBA1C MFR BLD HPLC: 5.5 %
HCT VFR BLD CALC: 47.2 %
HDLC SERPL-MCNC: 44 MG/DL
HGB BLD-MCNC: 15.4 G/DL
IMM GRANULOCYTES NFR BLD AUTO: 0.4 %
KETONES URINE: NEGATIVE MG/DL
LDLC SERPL CALC-MCNC: 101 MG/DL
LEUKOCYTE ESTERASE URINE: NEGATIVE
LYMPHOCYTES # BLD AUTO: 1.64 K/UL
LYMPHOCYTES NFR BLD AUTO: 19.4 %
MAGNESIUM SERPL-MCNC: 2.2 MG/DL
MAN DIFF?: NORMAL
MCHC RBC-ENTMCNC: 30.4 PG
MCHC RBC-ENTMCNC: 32.6 GM/DL
MCV RBC AUTO: 93.1 FL
MICROALBUMIN 24H UR DL<=1MG/L-MCNC: <1.2 MG/DL
MICROALBUMIN/CREAT 24H UR-RTO: NORMAL MG/G
MONOCYTES # BLD AUTO: 0.66 K/UL
MONOCYTES NFR BLD AUTO: 7.8 %
NEUTROPHILS # BLD AUTO: 5.92 K/UL
NEUTROPHILS NFR BLD AUTO: 70 %
NITRITE URINE: NEGATIVE
NONHDLC SERPL-MCNC: 135 MG/DL
PH URINE: 7.5
PLATELET # BLD AUTO: 200 K/UL
POTASSIUM SERPL-SCNC: 4.4 MMOL/L
PROT SERPL-MCNC: 7.1 G/DL
PROTEIN URINE: NEGATIVE MG/DL
RBC # BLD: 5.07 M/UL
RBC # FLD: 13.3 %
SODIUM SERPL-SCNC: 137 MMOL/L
SPECIFIC GRAVITY URINE: 1.01
TRIGL SERPL-MCNC: 195 MG/DL
TSH SERPL-ACNC: 0.78 UIU/ML
UROBILINOGEN URINE: 1 MG/DL
VIT B12 SERPL-MCNC: >2000 PG/ML
WBC # FLD AUTO: 8.45 K/UL

## 2023-12-16 RX ORDER — ATORVASTATIN CALCIUM 40 MG/1
40 TABLET, FILM COATED ORAL
Qty: 90 | Refills: 3 | Status: ACTIVE | COMMUNITY
Start: 1900-01-01 | End: 1900-01-01

## 2024-06-07 ENCOUNTER — APPOINTMENT (OUTPATIENT)
Dept: ULTRASOUND IMAGING | Facility: HOSPITAL | Age: 49
End: 2024-06-07
Payer: COMMERCIAL

## 2024-06-07 ENCOUNTER — OUTPATIENT (OUTPATIENT)
Dept: OUTPATIENT SERVICES | Facility: HOSPITAL | Age: 49
LOS: 1 days | End: 2024-06-07
Payer: COMMERCIAL

## 2024-06-07 PROCEDURE — 76770 US EXAM ABDO BACK WALL COMP: CPT

## 2024-06-07 PROCEDURE — 76770 US EXAM ABDO BACK WALL COMP: CPT | Mod: 26

## 2024-06-13 ENCOUNTER — APPOINTMENT (OUTPATIENT)
Dept: NEPHROLOGY | Facility: CLINIC | Age: 49
End: 2024-06-13
Payer: COMMERCIAL

## 2024-06-13 VITALS — WEIGHT: 188 LBS | BODY MASS INDEX: 27.76 KG/M2

## 2024-06-13 VITALS — SYSTOLIC BLOOD PRESSURE: 121 MMHG | DIASTOLIC BLOOD PRESSURE: 83 MMHG | HEART RATE: 71 BPM

## 2024-06-13 DIAGNOSIS — R34 ANURIA AND OLIGURIA: ICD-10-CM

## 2024-06-13 DIAGNOSIS — R79.9 ABNORMAL FINDING OF BLOOD CHEMISTRY, UNSPECIFIED: ICD-10-CM

## 2024-06-13 DIAGNOSIS — E83.52 HYPERCALCEMIA: ICD-10-CM

## 2024-06-13 DIAGNOSIS — R68.89 OTHER GENERAL SYMPTOMS AND SIGNS: ICD-10-CM

## 2024-06-13 DIAGNOSIS — I10 ESSENTIAL (PRIMARY) HYPERTENSION: ICD-10-CM

## 2024-06-13 DIAGNOSIS — R82.992 HYPEROXALURIA: ICD-10-CM

## 2024-06-13 DIAGNOSIS — Z79.899 OTHER LONG TERM (CURRENT) DRUG THERAPY: ICD-10-CM

## 2024-06-13 DIAGNOSIS — N20.9 URINARY CALCULUS, UNSPECIFIED: ICD-10-CM

## 2024-06-13 DIAGNOSIS — R82.991 HYPOCITRATURIA: ICD-10-CM

## 2024-06-13 PROCEDURE — G2211 COMPLEX E/M VISIT ADD ON: CPT

## 2024-06-13 PROCEDURE — 36415 COLL VENOUS BLD VENIPUNCTURE: CPT

## 2024-06-13 PROCEDURE — 99214 OFFICE O/P EST MOD 30 MIN: CPT

## 2024-06-13 RX ORDER — VALSARTAN AND HYDROCHLOROTHIAZIDE 160; 12.5 MG/1; MG/1
160-12.5 TABLET, FILM COATED ORAL
Qty: 90 | Refills: 3 | Status: ACTIVE | COMMUNITY
Start: 1900-01-01 | End: 1900-01-01

## 2024-06-13 NOTE — ASSESSMENT
[FreeTextEntry1] : # HTN controlled.  * Continue diovan/HCTZ.  * Recheck labs.  * The patient's blood pressure was checked with the [employer] [id number]-[id number] using the SPRINT trial protocol after sitting quietly in an empty room with arm supported, back supported, and feet on the floor for 5 minutes. The average of 3 readings were taken.  * A counseling information sheet on blood pressure and staying healthy has been given (which they have been instructed to read).  * The patient has been counseled to check their BP at home with an automatic arm cuff, write down the readings, and reach me directly on the phone immediately if they are persistently > 180 systolic or if SBP is less than 100 or if lightheadedness develops. They were counseled to bring in all blood pressure readings and medications next visit.  * The patient has been counseled that regular office follow-up (at least every 6 months for now) is important for monitoring and for their health, and that it is their responsibility to make follow up appointments.  * The patient also has been counseled that they must never stop or change any medications without discussing this with me (or another physician).  # Kidney stones with hyperoxaluria and hypocitraturia.  * Continue potassium citrate.  * Low oxalate diet.  * Check 24 hour urine.  * Recheck ultrasound.  # Overweight.  * Weight loss counseling given.

## 2024-06-13 NOTE — HISTORY OF PRESENT ILLNESS
[FreeTextEntry1] : Kindly referred by Dr. Nunez for kidney stones. Covid vaccine x 3 [id number].    * BP controlled. BP 120s at home. No SOB with exertion. No CP. No lightheadedness. Compliant with medications. * 2 months ago had sensation of passing stone. US [id number] no stones. 7/23 litholink: good volume, borderline high oxalate, low citrate (360). Citrate increased to 2/1 tabs. Prev analysis calcium oxalate. * Cough completely resolved.   Previous history (20Qaq00): * Occasional nonproductive cough for 6 weeks. Dx with bronchitis by urgent care. No cough currently. Covid 2 weeks prior to cough.  * 24 hour urine in 12/22: 2.3 volume, 43 oxalate, citrate 629, calcium 102. 7/22/23 litholink: better volume, low citrate, oxalate 41. Working on low oxalate diet. Citrate increased to 30/15.  No renal colic. Nov22 US: 4 mm stone left kidney. Following up with Dr. Nuenz. Following low oxalate diet. * BP controlled. BP 120s - 130s at home. No lightheadedness. No CP/SOB. Compliant with medications. *   Previous history (20Jun23):  * 24 hour urine in 12/22: 2.3 volume, 43 oxalate, citrate 629, calcium 102. No renal colic. Nov22 US: 4 mm stone left kidney. Following up with Dr. Nunez. Following low oxalate diet. * No further vertigo. * BMI 28. * BMi stable at 28. * Labs performed by Dr. Rodriguez. HGA1c 5.8. * BP controlled. BP 120s at home. No lightheadedness. No CP/SOB. Compliant with medications. *   Previous history ([id number]): Email 02Feb: "I actually did have a weird experience - for several days last week, and ending on Monday, I was constantly light-headed.  It wasn't debilitating and I could still function normally, but I haven't experienced that before.  I checked my BP and it was 118/76. That doesn't seem low enough to be a problem (actually, I thought it was quite good), but I have no explanation for what happened.  I feel fine now.  I have my annual physical with Dr. Rodriguez at the end of February and I have scheduled an eye exam of next week. Not sure if this is something to worry about. " He says this was "spinning" and not presyncope.  * 24 hour urine in 12/22: 2.3 volume, 43 oxalate, citrate 629, calcium 102. * HTN controlled.  - 130s at home. No lightheadedness. No CP/SOB. Compliant with medications. * Labs to be checked by Dr. Rodriguez.   Previous history (29Nov22): * HTN controlled. BP 120s at home. No lightheadedness. No CP/SOB. Compliant with medications. * No renal colic. Ultrasound 4 mm midpole left. * Potasium citrate started last visit. Following low oxalate diet. * Labs checked by Dr. Figueroa while on potassium. This will be forwarded.   Previous history ([id number]): * Urine volume increased to 2.33. SS CaCo. * Oxalate 40. Citrate 492. PCR 1.4.  * No recent kidney stones. * HTN controlled. No lightheadedness. Compliant with medications.   Previous history (06Fkl57): * Passed first stone 20 years ago. He has passed at least 12 stones, usually flank pain. No interventions required.   1 month ago stone passed painlessly. Stone analysis in 2011 and 2009 calcium oxalate.   CT: 1/18/21--Multiple bilateral non-obstructing renal calculi, measuring up to 2mm: 3, 1mm left and 5-6, 1mm right renal stones. Some of these appear new; 2/15/17--Increased scattered 0.2 - 0.4 cm bilateral renal calcifications measuring up to 227HU; 5/20/10--Two tiny left upper pole renal stones, likely 6mm left renal cyst; 2/4/09--No stone seen; 7/30/04--Interval resolution of renal calculi and collecting system obstruction; 11/02--3 mm left distal ureteral stone.  MSE: 2/27/17--Borderline hyperoxaluria, marked hypocitraturia; 5/24/10--Low volume, elevated Ca++Oxalate and uric acid. (To be obtained.)

## 2024-06-20 LAB
ALBUMIN SERPL ELPH-MCNC: 4.9 G/DL
ALP BLD-CCNC: 68 U/L
ALT SERPL-CCNC: 40 U/L
ANION GAP SERPL CALC-SCNC: 14 MMOL/L
APPEARANCE: CLEAR
AST SERPL-CCNC: 28 U/L
BASOPHILS # BLD AUTO: 0.03 K/UL
BASOPHILS NFR BLD AUTO: 0.4 %
BILIRUB SERPL-MCNC: 0.8 MG/DL
BILIRUBIN URINE: NEGATIVE
BLOOD URINE: NEGATIVE
BUN SERPL-MCNC: 20 MG/DL
CALCIUM SERPL-MCNC: 9.7 MG/DL
CHLORIDE SERPL-SCNC: 99 MMOL/L
CHOLEST SERPL-MCNC: 189 MG/DL
CO2 SERPL-SCNC: 25 MMOL/L
COLOR: YELLOW
CREAT SERPL-MCNC: 0.74 MG/DL
CREAT SPEC-SCNC: 87 MG/DL
EGFR: 110 ML/MIN/1.73M2
EOSINOPHIL # BLD AUTO: 0.23 K/UL
EOSINOPHIL NFR BLD AUTO: 2.9 %
ESTIMATED AVERAGE GLUCOSE: 114 MG/DL
FERRITIN SERPL-MCNC: 85 NG/ML
GLUCOSE QUALITATIVE U: NEGATIVE MG/DL
GLUCOSE SERPL-MCNC: 93 MG/DL
HBA1C MFR BLD HPLC: 5.6 %
HCT VFR BLD CALC: 47.1 %
HDLC SERPL-MCNC: 45 MG/DL
HGB BLD-MCNC: 15.4 G/DL
IMM GRANULOCYTES NFR BLD AUTO: 0.6 %
KETONES URINE: NEGATIVE MG/DL
LDLC SERPL CALC-MCNC: 112 MG/DL
LEUKOCYTE ESTERASE URINE: NEGATIVE
LYMPHOCYTES # BLD AUTO: 1.75 K/UL
LYMPHOCYTES NFR BLD AUTO: 22 %
MAGNESIUM SERPL-MCNC: 2.2 MG/DL
MAN DIFF?: NORMAL
MCHC RBC-ENTMCNC: 30.4 PG
MCHC RBC-ENTMCNC: 32.7 GM/DL
MCV RBC AUTO: 92.9 FL
MICROALBUMIN 24H UR DL<=1MG/L-MCNC: <1.2 MG/DL
MICROALBUMIN/CREAT 24H UR-RTO: NORMAL MG/G
MONOCYTES # BLD AUTO: 0.8 K/UL
MONOCYTES NFR BLD AUTO: 10.1 %
NEUTROPHILS # BLD AUTO: 5.1 K/UL
NEUTROPHILS NFR BLD AUTO: 64 %
NITRITE URINE: NEGATIVE
NONHDLC SERPL-MCNC: 144 MG/DL
PH URINE: 7
PLATELET # BLD AUTO: 171 K/UL
POTASSIUM SERPL-SCNC: 4.4 MMOL/L
PROT SERPL-MCNC: 7.2 G/DL
PROTEIN URINE: NEGATIVE MG/DL
RBC # BLD: 5.07 M/UL
RBC # FLD: 13.1 %
SODIUM SERPL-SCNC: 138 MMOL/L
SPECIFIC GRAVITY URINE: 1.02
TRIGL SERPL-MCNC: 185 MG/DL
TSH SERPL-ACNC: 0.98 UIU/ML
UROBILINOGEN URINE: 0.2 MG/DL
VIT B12 SERPL-MCNC: 948 PG/ML
WBC # FLD AUTO: 7.96 K/UL

## 2024-08-13 ENCOUNTER — RX RENEWAL (OUTPATIENT)
Age: 49
End: 2024-08-13

## 2024-12-03 ENCOUNTER — NON-APPOINTMENT (OUTPATIENT)
Age: 49
End: 2024-12-03

## 2024-12-03 ENCOUNTER — APPOINTMENT (OUTPATIENT)
Dept: NEPHROLOGY | Facility: CLINIC | Age: 49
End: 2024-12-03
Payer: COMMERCIAL

## 2024-12-03 VITALS — DIASTOLIC BLOOD PRESSURE: 83 MMHG | SYSTOLIC BLOOD PRESSURE: 124 MMHG | HEART RATE: 68 BPM

## 2024-12-03 VITALS — WEIGHT: 192 LBS | BODY MASS INDEX: 28.35 KG/M2

## 2024-12-03 DIAGNOSIS — R82.991 HYPOCITRATURIA: ICD-10-CM

## 2024-12-03 DIAGNOSIS — R82.992 HYPEROXALURIA: ICD-10-CM

## 2024-12-03 DIAGNOSIS — Z86.39 PERSONAL HISTORY OF OTHER ENDOCRINE, NUTRITIONAL AND METABOLIC DISEASE: ICD-10-CM

## 2024-12-03 DIAGNOSIS — I10 ESSENTIAL (PRIMARY) HYPERTENSION: ICD-10-CM

## 2024-12-03 DIAGNOSIS — N20.9 URINARY CALCULUS, UNSPECIFIED: ICD-10-CM

## 2024-12-03 PROCEDURE — G2211 COMPLEX E/M VISIT ADD ON: CPT | Mod: NC

## 2024-12-03 PROCEDURE — 99214 OFFICE O/P EST MOD 30 MIN: CPT

## 2025-03-29 ENCOUNTER — NON-APPOINTMENT (OUTPATIENT)
Age: 50
End: 2025-03-29

## 2025-04-03 ENCOUNTER — NON-APPOINTMENT (OUTPATIENT)
Age: 50
End: 2025-04-03

## 2025-04-03 ENCOUNTER — APPOINTMENT (OUTPATIENT)
Dept: NEPHROLOGY | Facility: CLINIC | Age: 50
End: 2025-04-03
Payer: COMMERCIAL

## 2025-04-03 VITALS — WEIGHT: 172 LBS | BODY MASS INDEX: 25.4 KG/M2

## 2025-04-03 VITALS — DIASTOLIC BLOOD PRESSURE: 71 MMHG | HEART RATE: 93 BPM | SYSTOLIC BLOOD PRESSURE: 109 MMHG

## 2025-04-03 DIAGNOSIS — R82.991 HYPOCITRATURIA: ICD-10-CM

## 2025-04-03 DIAGNOSIS — N20.9 URINARY CALCULUS, UNSPECIFIED: ICD-10-CM

## 2025-04-03 DIAGNOSIS — R82.992 HYPEROXALURIA: ICD-10-CM

## 2025-04-03 DIAGNOSIS — Z79.899 OTHER LONG TERM (CURRENT) DRUG THERAPY: ICD-10-CM

## 2025-04-03 DIAGNOSIS — I10 ESSENTIAL (PRIMARY) HYPERTENSION: ICD-10-CM

## 2025-04-03 DIAGNOSIS — R68.89 OTHER GENERAL SYMPTOMS AND SIGNS: ICD-10-CM

## 2025-04-03 PROCEDURE — G2211 COMPLEX E/M VISIT ADD ON: CPT | Mod: NC

## 2025-04-03 PROCEDURE — 99214 OFFICE O/P EST MOD 30 MIN: CPT

## 2025-07-14 ENCOUNTER — RX RENEWAL (OUTPATIENT)
Age: 50
End: 2025-07-14

## 2025-08-07 ENCOUNTER — APPOINTMENT (OUTPATIENT)
Dept: NEPHROLOGY | Facility: CLINIC | Age: 50
End: 2025-08-07
Payer: COMMERCIAL

## 2025-08-07 VITALS — HEART RATE: 82 BPM | SYSTOLIC BLOOD PRESSURE: 99 MMHG | DIASTOLIC BLOOD PRESSURE: 68 MMHG

## 2025-08-07 DIAGNOSIS — R82.991 HYPOCITRATURIA: ICD-10-CM

## 2025-08-07 DIAGNOSIS — E66.3 OVERWEIGHT: ICD-10-CM

## 2025-08-07 DIAGNOSIS — I10 ESSENTIAL (PRIMARY) HYPERTENSION: ICD-10-CM

## 2025-08-07 DIAGNOSIS — R82.992 HYPEROXALURIA: ICD-10-CM

## 2025-08-07 DIAGNOSIS — N20.9 URINARY CALCULUS, UNSPECIFIED: ICD-10-CM

## 2025-08-07 PROCEDURE — 99214 OFFICE O/P EST MOD 30 MIN: CPT

## 2025-08-07 PROCEDURE — G2211 COMPLEX E/M VISIT ADD ON: CPT | Mod: NC
